# Patient Record
Sex: FEMALE | Race: BLACK OR AFRICAN AMERICAN | Employment: UNEMPLOYED | URBAN - METROPOLITAN AREA
[De-identification: names, ages, dates, MRNs, and addresses within clinical notes are randomized per-mention and may not be internally consistent; named-entity substitution may affect disease eponyms.]

---

## 2018-07-03 ENCOUNTER — OFFICE VISIT (OUTPATIENT)
Dept: NEUROLOGY | Age: 58
End: 2018-07-03

## 2018-07-03 VITALS
OXYGEN SATURATION: 97 % | BODY MASS INDEX: 45 KG/M2 | RESPIRATION RATE: 18 BRPM | WEIGHT: 280 LBS | HEIGHT: 66 IN | SYSTOLIC BLOOD PRESSURE: 156 MMHG | HEART RATE: 78 BPM | DIASTOLIC BLOOD PRESSURE: 82 MMHG

## 2018-07-03 DIAGNOSIS — G61.0 GBS (GUILLAIN-BARRE SYNDROME) (HCC): Primary | ICD-10-CM

## 2018-07-03 DIAGNOSIS — M79.2 NEUROPATHIC PAIN: ICD-10-CM

## 2018-07-03 RX ORDER — PREGABALIN 75 MG/1
150 CAPSULE ORAL EVERY 8 HOURS
COMMUNITY

## 2018-07-03 RX ORDER — DOCUSATE SODIUM 100 MG/1
100 CAPSULE, LIQUID FILLED ORAL
COMMUNITY

## 2018-07-03 RX ORDER — PREGABALIN 50 MG/1
CAPSULE ORAL EVERY 8 HOURS
COMMUNITY

## 2018-07-03 RX ORDER — DULOXETIN HYDROCHLORIDE 30 MG/1
30 CAPSULE, DELAYED RELEASE ORAL DAILY
COMMUNITY

## 2018-07-03 RX ORDER — OXYCODONE HYDROCHLORIDE 5 MG/1
5 CAPSULE ORAL
COMMUNITY

## 2018-07-03 RX ORDER — METHOCARBAMOL 500 MG/1
TABLET, FILM COATED ORAL 4 TIMES DAILY
COMMUNITY

## 2018-07-03 RX ORDER — FAMOTIDINE 20 MG/1
20 TABLET, FILM COATED ORAL 2 TIMES DAILY
COMMUNITY

## 2018-07-03 RX ORDER — LOSARTAN POTASSIUM 50 MG/1
TABLET ORAL DAILY
COMMUNITY

## 2018-07-03 RX ORDER — PREGABALIN 150 MG/1
150 CAPSULE ORAL 2 TIMES DAILY
Qty: 60 CAP | Refills: 0 | Status: SHIPPED | OUTPATIENT
Start: 2018-07-03

## 2018-07-03 RX ORDER — BACLOFEN 10 MG/1
TABLET ORAL 3 TIMES DAILY
COMMUNITY

## 2018-07-03 RX ORDER — MORPHINE SULFATE 15 MG/1
TABLET, FILM COATED, EXTENDED RELEASE ORAL EVERY 8 HOURS
COMMUNITY

## 2018-07-03 RX ORDER — GLUCOSAMINE SULFATE 1500 MG
POWDER IN PACKET (EA) ORAL DAILY
COMMUNITY

## 2018-07-03 NOTE — MR AVS SNAPSHOT
[facility] [address] [place] [address] 
[phone] Patient: Sheryl Sicard MRN: [id number] :1960 Visit Information Date & Time Provider Department Dept. Phone Encounter #  
 7/3/2018  3:00 PM Mandy Baez, [address] [facility] [phone] Upcoming Health Maintenance Date Due Hepatitis C Screening 1960 DTaP/Tdap/Td series (1 - Tdap) 1983 PAP AKA CERVICAL CYTOLOGY 1983 BREAST CANCER SCRN MAMMOGRAM 2012 FOBT Q 1 YEAR AGE 50-75 2012 Influenza Age 5 to Adult 2018 Allergies as of 7/3/2018  Review Complete On: 7/3/2018 By: Mandy Baez DO Severity Noted Reaction Type Reactions Aspirin  2018    Other (comments) Ringing in ears Current Immunizations  Never Reviewed No immunizations on file. Not reviewed this visit You Were Diagnosed With   
  
 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  
 156/82 78 18 5' 6\" (1.676 m) 280 lb (127 kg) 97% BMI Smoking Status 45.19 kg/m2 Never Smoker Vitals History BMI and BSA Data Body Mass Index Body Surface Area  
 45.19 kg/m 2 2.43 m 2 Your Updated Medication List  
  
   
This list is accurate as of 7/3/18  3:36 PM.  Always use your most recent med list.  
  
  
  
  
 baclofen 10 mg tablet Commonly known as:  LIORESAL Take  by mouth three (3) times daily. COLACE 100 mg capsule Generic drug:  docusate sodium Take 100 mg by mouth two (2) times daily as needed for Constipation. CYMBALTA 30 mg capsule Generic drug:  DULoxetine Take 30 mg by mouth daily. famotidine 20 mg tablet Commonly known as:  PEPCID Take 20 mg by mouth two (2) times a day. losartan 50 mg tablet Commonly known as:  COZAAR Take  by mouth daily. * LYRICA 50 mg capsule Generic drug:  pregabalin Take  by mouth every eight (8) hours. * LYRICA 75 mg capsule Generic drug:  pregabalin Take 150 mg by mouth every eight (8) hours. MS CONTIN 15 mg CR tablet Generic drug:  morphine CR Take  by mouth every eight (8) hours. oxyCODONE 5 mg capsule Commonly known as:  OXYIR Take 5 mg by mouth every six (6) hours as needed. Take 4 tabs every 6 hours PRN  
  
 ROBAXIN 500 mg tablet Generic drug:  methocarbamol Take  by mouth four (4) times daily. VITAMIN D3 1,000 unit Cap Generic drug:  cholecalciferol Take  by mouth daily. * Notice: This list has 2 medication(s) that are the same as other medications prescribed for you. Read the directions carefully, and ask your doctor or other care provider to review them with you. To-Do List   
 07/03/2018 Neurology:  EMG LIMITED Patient Instructions A Healthy Lifestyle: Care Instructions Your Care Instructions A healthy lifestyle can help you feel good, stay at a healthy weight, and have plenty of energy for both work and play. A healthy lifestyle is something you can share with your whole family. A healthy lifestyle also can lower your risk for serious health problems, such as high blood pressure, heart disease, and diabetes. You can follow a few steps listed below to improve your health and the health of your family. Follow-up care is a key part of your treatment and safety. Be sure to make and go to all appointments, and call your doctor if you are having problems. It's also a good idea to know your test results and keep a list of the medicines you take. How can you care for yourself at home? · Do not eat too much sugar, fat, or fast foods.  You can still have dessert and treats now and then. The goal is moderation. · Start small to improve your eating habits. Pay attention to portion sizes, drink less juice and soda pop, and eat more fruits and vegetables. ¨ Eat a healthy amount of food. A 3-ounce serving of meat, for example, is about the size of a deck of cards. Fill the rest of your plate with vegetables and whole grains. ¨ Limit the amount of soda and sports drinks you have every day. Drink more water when you are thirsty. ¨ Eat at least 5 servings of fruits and vegetables every day. It may seem like a lot, but it is not hard to reach this goal. A serving or helping is 1 piece of fruit, 1 cup of vegetables, or 2 cups of leafy, raw vegetables. Have an apple or some carrot sticks as an afternoon snack instead of a candy bar. Try to have fruits and/or vegetables at every meal. 
· Make exercise part of your daily routine. You may want to start with simple activities, such as walking, bicycling, or slow swimming. Try to be active 30 to 60 minutes every day. You do not need to do all 30 to 60 minutes all at once. For example, you can exercise 3 times a day for 10 or 20 minutes. Moderate exercise is safe for most people, but it is always a good idea to talk to your doctor before starting an exercise program. 
· Keep moving. [place] Gut the lawn, work in the garden, or [employer]. Take the stairs instead of the elevator at work. · If you smoke, quit. People who smoke have an increased risk for heart attack, stroke, cancer, and other lung illnesses. Quitting is hard, but there are ways to boost your chance of quitting tobacco for good. ¨ Use nicotine gum, patches, or lozenges. ¨ Ask your doctor about stop-smoking programs and medicines. ¨ Keep trying.  
In addition to reducing your risk of diseases in the future, you will notice some benefits soon after you stop using tobacco. If you have shortness of breath or asthma symptoms, they will likely get better within a few weeks after you quit. · Limit how much alcohol you drink. Moderate amounts of alcohol (up to 2 drinks a day for men, 1 drink a day for women) are okay. But drinking too much can lead to liver problems, high blood pressure, and other health problems. Family health If you have a family, there are many things you can do together to improve your health. · Eat meals together as a family as often as possible. · Eat healthy foods. This includes fruits, vegetables, lean meats and dairy, and whole grains. · Include your family in your fitness plan. Most people think of activities such as jogging or tennis as the way to fitness, but there are many ways you and your family can be more active. Anything that makes you breathe hard and gets your heart pumping is exercise. Here are some tips: 
¨ Walk to do errands or to take your child to school or the bus. ¨ Go for a family bike ride after dinner instead of watching TV. Where can you learn more? Go to http://renan-harriett.info/. Enter X707 in the search box to learn more about \"A Healthy Lifestyle: Care Instructions. \" Current as of: May 12, 2017 Content Version: 11.4 © 0685-0133 [employer]. Care instructions adapted under license by [employer] (which disclaims liability or warranty for this information). If you have questions about a medical condition or this instruction, always ask your healthcare professional. [place] [address] any warranty or liability for your use of this information. Introducing [facility] [place] [facility]! [employer] introduces [employer] patient portal. Now you can access parts of your medical record, email your doctor's office, and request medication refills online. 1. In your internet browser, go to [email or web address] 2. Click on the First Time User? Click Here link in the Sign In box. You will see the New Member Sign Up page. 3. Enter your Itiva Access Code exactly as it appears below. You will not need to use this code after youve completed the sign-up process. If you do not sign up before the expiration date, you must request a new code. · Itiva Access Code: [email or web address] Expires: 10/1/2018  2:19 PM 
 
4. Enter the last four digits of your Social Security Number (xxxx) and Date of Birth (mm/dd/yyyy) as indicated and click Submit. You will be taken to the next sign-up page. 5. Create a Itiva ID. This will be your Itiva login ID and cannot be changed, so think of one that is secure and easy to remember. 6. Create a Itiva password. You can change your password at any time. 7. Enter your Password Reset Question and Answer. This can be used at a later time if you forget your password. 8. Enter your e-mail address. You will receive e-mail notification when new information is available in [address]. 9. Click Sign Up. You can now view and download portions of your medical record. 10. Click the Download Summary menu link to download a portable copy of your medical information. If you have questions, please visit the Frequently Asked Questions section of the Itiva website. Remember, Itiva is NOT to be used for urgent needs. For medical emergencies, dial 911. Now available from your iPhone and Android! Please provide this summary of care documentation to your next provider. Your primary care clinician is listed as Janes Lewis. If you have any questions after today's visit, please call [phone].

## 2018-07-03 NOTE — LETTER
7/3/2018 3:51 PM 
 
Patient:  Kendra Caro YOB: 1960 Date of Visit: 7/3/2018 Dear Cholo Hernandez MD 
[address] [place] [address] VIA Facsimile: [phone] 
 : 
 
 
Thank you for referring Ms. Kendra Caro to me for evaluation/treatment. Below are the relevant portions of my assessment and plan of care. If you have questions, please do not hesitate to call me. I look forward to following Ms. Bhatti Fearing along with you.  
 
 
 
Sincerely, 
 
 
Phill Medina, DO

## 2018-07-03 NOTE — PATIENT INSTRUCTIONS

## 2018-07-03 NOTE — PROGRESS NOTES
[facility]   NEW PATIENT EVALUATION/CONSULTATION       PATIENT NAME: Corrinne Gault    MRN: [id number]    REASON FOR CONSULTATION: Extremity weakness    07/03/18      Previous records (physician notes, laboratory reports, and radiology reports) and imaging studies were reviewed and summarized. My recommendations will be communicated back to the patient's physician(s) via electronic medical record and/or by [address] mail. HISTORY OF PRESENT ILLNESS:  Corrinne Gault is a 56 y.o. right handed female presenting for evaluation of extremity weakness, generalized pain. Pt was on vacation in [facility] April 2018. She attempted to stand to take a picture of her grandchildren and noted that she was having rather sudden onset difficulty standing. She could move her arms but noted significant LE weakness. She required a wheelchair for transportation from that point forward. No LBP, bowel/urinary incontinence. Admitted to numbness over the top of her right thigh non-progressive) 3 days prior to onset of weakness. She was admitted to [facility] (4/6/18-4/19/18) due to weakness and sharp pain into the lower extremities. Also complained of pains into the forearms. Denied dysphagia, dysarthria, SOB. Of note, 2 weeks prior to her vacation she was prescribed an antibiotic for unspecified \"stomach infection\". She did report some chronic stomach pain. No recent vaccinations. On review of OSH records, DTRs were absent in the LE, 1/5 flexion at hip, 4/5 DF/PF, 5/5 UE b/l. S/P LP showed elevation in protein. MRI B/C/T spine showed small extra-axial cyst L temporal lobe most c/w arachnoid cyst, otherwise multilevel degenerative changes without noted cord pathology. MRI Lumbar spine 4/7/18 showed mild multilevel spondylosis without significant canal compromise. Received 5 day course of IVIG with subsequent improvement in weakness.   She completed a 5 day stay in rehab prior to returning home. She reports her LE weakness is still present. She is using a walker to assist with ambulation at home. She reports pain into her b/l legs and both arms described as \"my legs are encased in cement with tingling/throbbing\". She also endorses numbness/tingling into the distal forearms up the shoulders b/l. Her pain never improved from her recent hospitalization. She was taking Lyrica previously but ran out of medication this past week. She was taking 100mg TID. She felt pain was better controlled on the Lyrica. PAST MEDICAL HISTORY:  Past Medical History:   Diagnosis Date    Arthritis     Hypertension        PAST SURGICAL HISTORY:  Past Surgical History:   Procedure Laterality Date    HX CHOLECYSTECTOMY      HX GYN         FAMILY HISTORY:   Family History   Problem Relation Age of Onset    Hypertension Mother      SOCIAL HISTORY:  Social History     Social History    Marital status:      Spouse name: N/A    Number of children: N/A    Years of education: N/A     Social History Main Topics    Smoking status: Never Smoker    Smokeless tobacco: Never Used    Alcohol use Yes    Drug use: None    Sexual activity: Not Asked     Other Topics Concern    None     Social History Narrative    None         MEDICATIONS:   Current Outpatient Prescriptions   Medication Sig Dispense Refill    baclofen (LIORESAL) 10 mg tablet Take  by mouth three (3) times daily.  docusate sodium (COLACE) 100 mg capsule Take 100 mg by mouth two (2) times daily as needed for Constipation.  DULoxetine (CYMBALTA) 30 mg capsule Take 30 mg by mouth daily.  methocarbamol (ROBAXIN) 500 mg tablet Take  by mouth four (4) times daily.  oxyCODONE (OXYIR) 5 mg capsule Take 5 mg by mouth every six (6) hours as needed. Take 4 tabs every 6 hours PRN      pregabalin (LYRICA) 50 mg capsule Take  by mouth every eight (8) hours.       pregabalin (LYRICA) 75 mg capsule Take 150 mg by mouth every eight (8) hours.  morphine CR (MS CONTIN) 15 mg CR tablet Take  by mouth every eight (8) hours.  cholecalciferol (VITAMIN D3) 1,000 unit cap Take  by mouth daily.  famotidine (PEPCID) 20 mg tablet Take 20 mg by mouth two (2) times a day.  losartan (COZAAR) 50 mg tablet Take  by mouth daily.  pregabalin (LYRICA) 150 mg capsule Take 1 Cap by mouth two (2) times a day. Max Daily Amount: 300 mg. 60 Cap 0           ALLERGIES:  Allergies   Allergen Reactions    Aspirin Other (comments)     Ringing in ears          REVIEW OF SYSTEMS:  10 point ROS reviewed with patient. Please see scanned document under media. PHYSICAL EXAM:  Vital Signs:   Visit Vitals    /82    Pulse 78    Resp 18    Ht 5' 6\" (1.676 m)    Wt 127 kg (280 lb)    SpO2 97%    BMI 45.19 kg/m2        General Medical Exam:  General:  Well appearing, comfortable, in no apparent distress. Eyes/ENT: see cranial nerve examination. Neck: No masses appreciated. Full range of motion without tenderness. Respiratory:  Clear to auscultation, good air entry bilaterally. Cardiac:  Regular rate and rhythm, no murmur. GI:  Soft, non-tender, non-distended abdomen. Bowel sounds normal. No masses, organomegaly. Extremities:  No deformities, edema, or skin discoloration. Skin:  No rashes or lesions. Neurological:  · Mental Status:  Alert and oriented to person, place, and time with fluent speech. · Cranial Nerves:   CNII/III/IV/VI: visual fields full to confrontation, EOMI, PERRL, no ptosis or nystagmus. CN V: Facial sensation intact bilaterally, masseter 5/5   CN VII: Facial muscles symmetric and strong   CN VIII: Hears finger rub well bilaterally, intact vestibular function   CN IX/X: Normal palatal movement   CN XI: Full strength shoulder shrug bilaterally   CN XII: Tongue protrusion full and midline without fasciculation or atrophy  · Motor: Normal tone and muscle bulk with no pronator drift.    Individual muscle group testing:  Shoulder abduction:   Left:5-/5   Right : 5-/5    Shoulder adduction:   Left:5-/5   Right : 5-/5    Elbow flexion:      Left:5-/5   Right : 5-/5  Elbow extension:    Left:5-/5   Right : 5-/5   Wrist flexion:    Left:5-/5   Right : 5-/5  Wrist extension:    Left:5-/5   Right : 5-/5  Finger flexion:    Left:5-/5   Right : 5-/5    Finger extension:   Left:5-/5   Right : 5-/5   Finger abduction:  Left:5-/5   Right : 5-/5   Finger adduction:   Left:5-/5   Right : 5-/5  Hip flexion:     Left:4-/5   Right : 4-/5         Hip extension:   Left:5-/5   Right : 5-/5    Knee flexion:     Left:5-/5   Right : 5-/5    Knee extension:   Left:4+/5   Right : 4+/5    Dorsiflexion:     Left:4+/5   Right : 4+/5  Plantar flexion:    Left:5-/5   Right : 5-/5      · MSRs: No crossed adductors or clonus. RIGHT  LEFT   Brachioradialis 0 0   Biceps trace trace   Triceps 0 0   Knee 0 0   Achilles 0 0        Plantar response Downward Downward          · Sensation: Decreased LT/pinprick/temperature/vibration LE b/l distal and proximal. Intact proprioception  . · Coordination: No dysmetria. Normal rapid alternating movements  · Gait: Walker dependent, unsteady      ASSESSMENT:      ICD-10-CM ICD-9-CM    1. 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 0       EMG LIMITED      pregabalin (LYRICA) 150 mg capsule   2. Neuropathic pain M79.2 729.2       EMG LIMITED      pregabalin (LYRICA) 150 mg capsule   56year old AAF with a h/o HTN, OA presenting with acute onset b/l LE>UE weakness, paresthesias and neuropathic pain 4/2018 s/p hospitalization 4/6/18-4/19/18 with working dx of GBS. S/P LP showing elevation in protein. MRI B/C/T spine showed small extra-axial cyst L temporal lobe most c/w arachnoid cyst, otherwise multilevel degenerative changes without noted cord pathology. MRI Lumbar spine 4/7/18 showed mild multilevel spondylosis without significant canal compromise.   Received 5 day course of IVIG with subsequent improvement in weakness. She is left with rather significant neuropathic pain symptoms/paresthesias of the extremities. Pain management referral has been provided by her PCP. Will refill her Lyrica in the interim to assist with pain. I would like to proceed with electrodiagnostic testing for diagnostic confirmation of an acute acquired demyelinating polyneuropathy and to establish a baseline in the event of recurrent episodes. PLAN:  · EMG Demyelinating protocol  · Resume Lyrica   · Pain management referral (previously provided by PCP)    Follow-up Disposition: 1-2 weeks after EMG      I have discussed the diagnosis with the patient and the intended plan as seen in the above orders. Patient is in agreement. The patient has received an after-visit summary and questions were answered concerning future plans. I have discussed medication side effects and warnings with the patient as well. Divina Shipman DO  Staff Neurologist  Diplomate, [address] [facility] Board of Psychiatry & Neurology       CC Referring provider:  Carlos Manuel Cross MD

## 2018-07-30 ENCOUNTER — TELEPHONE (OUTPATIENT)
Dept: NEUROLOGY | Age: 58
End: 2018-07-30

## 2020-07-29 ENCOUNTER — HOSPITAL ENCOUNTER (OUTPATIENT)
Dept: GENERAL RADIOLOGY | Age: 60
Discharge: HOME | End: 2020-07-29
Payer: COMMERCIAL

## 2020-07-29 DIAGNOSIS — G89.29 CHRONIC LOW BACK PAIN: ICD-10-CM

## 2020-07-29 DIAGNOSIS — Z79.891 USE OF OPIATES FOR THERAPEUTIC PURPOSES: ICD-10-CM

## 2020-07-29 DIAGNOSIS — M54.50 CHRONIC LOW BACK PAIN: ICD-10-CM

## 2020-07-29 DIAGNOSIS — M25.552 LEFT HIP PAIN: ICD-10-CM

## 2020-07-29 PROCEDURE — 72114 X-RAY EXAM L-S SPINE BENDING: CPT

## 2021-09-03 ENCOUNTER — TRANSCRIBE ORDER (OUTPATIENT)
Dept: SCHEDULING | Age: 61
End: 2021-09-03

## 2021-09-03 DIAGNOSIS — Z79.891 LONG-TERM CURRENT USE OF OPIATE ANALGESIC: ICD-10-CM

## 2021-09-03 DIAGNOSIS — G62.89 OTHER SPECIFIED POLYNEUROPATHIES: ICD-10-CM

## 2021-09-03 DIAGNOSIS — G89.29 OTHER CHRONIC PAIN: ICD-10-CM

## 2021-09-03 DIAGNOSIS — M54.50 LOW BACK PAIN: Primary | ICD-10-CM

## 2021-09-28 ENCOUNTER — TRANSCRIBE ORDER (OUTPATIENT)
Dept: SCHEDULING | Age: 61
End: 2021-09-28

## 2021-09-28 DIAGNOSIS — K62.89 CHRONIC IDIOPATHIC ANAL PAIN: ICD-10-CM

## 2021-09-28 DIAGNOSIS — M54.50 LOW BACK PAIN: Primary | ICD-10-CM

## 2021-09-28 DIAGNOSIS — Z79.891 ADMISSION FOR LONG-TERM OPIATE ANALGESIC USE: ICD-10-CM

## 2021-09-28 DIAGNOSIS — G89.29 CHRONIC IDIOPATHIC ANAL PAIN: ICD-10-CM

## 2021-09-28 DIAGNOSIS — G62.89 DISEASE RELATED PERIPHERAL NEUROPATHY: ICD-10-CM

## 2021-10-01 ENCOUNTER — HOSPITAL ENCOUNTER (OUTPATIENT)
Dept: MRI IMAGING | Age: 61
Discharge: HOME | End: 2021-10-01
Attending: PHYSICAL MEDICINE & REHABILITATION
Payer: COMMERCIAL

## 2021-10-01 DIAGNOSIS — G89.29 OTHER CHRONIC PAIN: ICD-10-CM

## 2021-10-01 DIAGNOSIS — Z79.891 LONG-TERM CURRENT USE OF OPIATE ANALGESIC: ICD-10-CM

## 2021-10-01 DIAGNOSIS — G62.89 OTHER SPECIFIED POLYNEUROPATHIES: ICD-10-CM

## 2021-10-01 DIAGNOSIS — M54.50 LOW BACK PAIN: ICD-10-CM

## 2021-10-01 PROCEDURE — 72148 MRI LUMBAR SPINE W/O DYE: CPT

## 2022-10-10 ENCOUNTER — TELEPHONE (OUTPATIENT)
Dept: NEUROLOGY | Age: 62
End: 2022-10-10

## 2022-10-10 NOTE — TELEPHONE ENCOUNTER
Patients needs her EMG results faxed over to [facility] to [facility].     Fax # [phone]    Office # [phone]    Please contact with any questions

## 2023-05-22 RX ORDER — PREGABALIN 150 MG/1
150 CAPSULE ORAL 2 TIMES DAILY
COMMUNITY
Start: 2018-07-03

## 2023-05-22 RX ORDER — PREGABALIN 50 MG/1
CAPSULE ORAL EVERY 8 HOURS
COMMUNITY

## 2023-05-22 RX ORDER — PREGABALIN 75 MG/1
150 CAPSULE ORAL EVERY 8 HOURS
COMMUNITY

## 2023-05-22 RX ORDER — OXYCODONE HYDROCHLORIDE 5 MG/1
5 CAPSULE ORAL EVERY 6 HOURS PRN
COMMUNITY

## 2023-05-22 RX ORDER — METHOCARBAMOL 500 MG/1
TABLET, FILM COATED ORAL 4 TIMES DAILY
COMMUNITY

## 2023-05-22 RX ORDER — BACLOFEN 10 MG/1
TABLET ORAL 3 TIMES DAILY
COMMUNITY

## 2023-05-22 RX ORDER — DULOXETIN HYDROCHLORIDE 30 MG/1
30 CAPSULE, DELAYED RELEASE ORAL DAILY
COMMUNITY

## 2023-05-22 RX ORDER — MORPHINE SULFATE 15 MG/1
TABLET, FILM COATED, EXTENDED RELEASE ORAL EVERY 8 HOURS
COMMUNITY

## 2023-05-22 RX ORDER — LOSARTAN POTASSIUM 50 MG/1
TABLET ORAL DAILY
COMMUNITY

## 2023-05-22 RX ORDER — FAMOTIDINE 20 MG/1
20 TABLET, FILM COATED ORAL 2 TIMES DAILY
COMMUNITY

## 2023-05-22 RX ORDER — PSEUDOEPHEDRINE HCL 30 MG
100 TABLET ORAL 2 TIMES DAILY PRN
COMMUNITY

## 2025-04-03 ENCOUNTER — TELEPHONE (OUTPATIENT)
Age: 65
End: 2025-04-03

## 2025-04-03 NOTE — TELEPHONE ENCOUNTER
Patient is calling to ask for her EMG results from 2019 to be mailed to her.    PSR does not see where she had an EMG in 2019.    Patient states she filled out a medial record release form recently in our office for this.    Please advise if patient had this test done.    PSR suggested she call medical records, however patient states when she was in the office the representative at the  stated they could see the EMG but did not know how to open it.

## 2025-04-04 NOTE — TELEPHONE ENCOUNTER
Patient called back and PSR advised her he does not see any medical record release form that have been signed recently but advised her to call medical records.    PSR informed her if she had the test done they will find it.

## 2025-04-08 NOTE — TELEPHONE ENCOUNTER
Unable to locate EMG report in chart due to EPIC conversion. Signed medical records request faxed to medical records.